# Patient Record
Sex: MALE | Race: WHITE
[De-identification: names, ages, dates, MRNs, and addresses within clinical notes are randomized per-mention and may not be internally consistent; named-entity substitution may affect disease eponyms.]

---

## 2018-11-12 ENCOUNTER — HOSPITAL ENCOUNTER (OUTPATIENT)
Dept: HOSPITAL 94 - VAS | Age: 58
Discharge: HOME | End: 2018-11-12
Attending: FAMILY MEDICINE
Payer: OTHER GOVERNMENT

## 2018-11-12 DIAGNOSIS — I73.89: Primary | ICD-10-CM

## 2018-11-12 DIAGNOSIS — F17.200: ICD-10-CM

## 2018-11-12 PROCEDURE — 93922 UPR/L XTREMITY ART 2 LEVELS: CPT

## 2020-08-14 ENCOUNTER — HOSPITAL ENCOUNTER (OUTPATIENT)
Dept: HOSPITAL 94 - SSTAY O | Age: 60
Discharge: HOME | End: 2020-08-14
Attending: RADIOLOGY
Payer: OTHER GOVERNMENT

## 2020-08-14 VITALS — SYSTOLIC BLOOD PRESSURE: 141 MMHG | DIASTOLIC BLOOD PRESSURE: 96 MMHG

## 2020-08-14 VITALS — DIASTOLIC BLOOD PRESSURE: 80 MMHG | SYSTOLIC BLOOD PRESSURE: 132 MMHG

## 2020-08-14 VITALS — HEIGHT: 68 IN | WEIGHT: 151.02 LBS | BODY MASS INDEX: 22.89 KG/M2

## 2020-08-14 VITALS — DIASTOLIC BLOOD PRESSURE: 80 MMHG | SYSTOLIC BLOOD PRESSURE: 123 MMHG

## 2020-08-14 VITALS — DIASTOLIC BLOOD PRESSURE: 75 MMHG | SYSTOLIC BLOOD PRESSURE: 134 MMHG

## 2020-08-14 DIAGNOSIS — Z85.850: ICD-10-CM

## 2020-08-14 DIAGNOSIS — Z79.899: ICD-10-CM

## 2020-08-14 DIAGNOSIS — J90: Primary | ICD-10-CM

## 2020-08-14 DIAGNOSIS — Z98.890: ICD-10-CM

## 2020-08-14 DIAGNOSIS — E03.9: ICD-10-CM

## 2020-08-14 DIAGNOSIS — Z87.891: ICD-10-CM

## 2020-08-14 DIAGNOSIS — M10.9: ICD-10-CM

## 2020-08-14 DIAGNOSIS — I10: ICD-10-CM

## 2020-08-14 DIAGNOSIS — J44.9: ICD-10-CM

## 2020-08-14 LAB
APPEARANCE SPUN FLD: (no result)
BASOPHILS NFR FLD MANUAL: 1 %
COLOR FLD: YELLOW
GLUCOSE FLD-MCNC: 81 MG/DL
LDH FLD-CCNC: 214 U/L
LYMPHOCYTES NFR FLD MANUAL: 97 %
MONOCYTES NFR FLD MANUAL: 1 %
NEUTROPHILS NFR FLD MANUAL: 1 %
PROT FLD-MCNC: 4 G/DL
RBC # FLD MANUAL: 4600 /CU MM
SPECIMEN VOL FLD: 50 ML
WBC # FLD MANUAL: 5300 /CU MM (ref 0–1000)

## 2020-08-14 PROCEDURE — 87070 CULTURE OTHR SPECIMN AEROBIC: CPT

## 2020-08-14 PROCEDURE — 82945 GLUCOSE OTHER FLUID: CPT

## 2020-08-14 PROCEDURE — 84157 ASSAY OF PROTEIN OTHER: CPT

## 2020-08-14 PROCEDURE — 71045 X-RAY EXAM CHEST 1 VIEW: CPT

## 2020-08-14 PROCEDURE — 83615 LACTATE (LD) (LDH) ENZYME: CPT

## 2020-08-14 PROCEDURE — 32555 ASPIRATE PLEURA W/ IMAGING: CPT

## 2020-08-14 PROCEDURE — 89051 BODY FLUID CELL COUNT: CPT

## 2020-08-24 ENCOUNTER — HOSPITAL ENCOUNTER (OUTPATIENT)
Dept: HOSPITAL 94 - SSTAY O | Age: 60
Discharge: HOME | End: 2020-08-24
Attending: RADIOLOGY
Payer: OTHER GOVERNMENT

## 2020-08-24 VITALS — DIASTOLIC BLOOD PRESSURE: 71 MMHG | SYSTOLIC BLOOD PRESSURE: 103 MMHG

## 2020-08-24 VITALS — SYSTOLIC BLOOD PRESSURE: 97 MMHG | DIASTOLIC BLOOD PRESSURE: 61 MMHG

## 2020-08-24 VITALS — DIASTOLIC BLOOD PRESSURE: 68 MMHG | SYSTOLIC BLOOD PRESSURE: 113 MMHG

## 2020-08-24 VITALS — SYSTOLIC BLOOD PRESSURE: 113 MMHG | DIASTOLIC BLOOD PRESSURE: 68 MMHG

## 2020-08-24 VITALS — SYSTOLIC BLOOD PRESSURE: 112 MMHG | DIASTOLIC BLOOD PRESSURE: 60 MMHG

## 2020-08-24 VITALS — DIASTOLIC BLOOD PRESSURE: 66 MMHG | SYSTOLIC BLOOD PRESSURE: 108 MMHG

## 2020-08-24 VITALS — HEIGHT: 68 IN | BODY MASS INDEX: 22.09 KG/M2 | WEIGHT: 145.73 LBS

## 2020-08-24 VITALS — SYSTOLIC BLOOD PRESSURE: 108 MMHG | DIASTOLIC BLOOD PRESSURE: 70 MMHG

## 2020-08-24 VITALS — SYSTOLIC BLOOD PRESSURE: 101 MMHG | DIASTOLIC BLOOD PRESSURE: 65 MMHG

## 2020-08-24 DIAGNOSIS — M10.9: ICD-10-CM

## 2020-08-24 DIAGNOSIS — Z79.899: ICD-10-CM

## 2020-08-24 DIAGNOSIS — J44.9: ICD-10-CM

## 2020-08-24 DIAGNOSIS — E85.9: ICD-10-CM

## 2020-08-24 DIAGNOSIS — I10: ICD-10-CM

## 2020-08-24 DIAGNOSIS — E03.9: ICD-10-CM

## 2020-08-24 DIAGNOSIS — C91.10: ICD-10-CM

## 2020-08-24 DIAGNOSIS — Z98.890: ICD-10-CM

## 2020-08-24 DIAGNOSIS — J90: Primary | ICD-10-CM

## 2020-08-24 PROCEDURE — 71045 X-RAY EXAM CHEST 1 VIEW: CPT

## 2020-08-24 PROCEDURE — 32555 ASPIRATE PLEURA W/ IMAGING: CPT

## 2020-09-17 ENCOUNTER — HOSPITAL ENCOUNTER (OUTPATIENT)
Dept: HOSPITAL 94 - SSTAY O | Age: 60
Discharge: HOME | End: 2020-09-17
Attending: RADIOLOGY
Payer: OTHER GOVERNMENT

## 2020-09-17 VITALS — SYSTOLIC BLOOD PRESSURE: 106 MMHG | DIASTOLIC BLOOD PRESSURE: 68 MMHG

## 2020-09-17 VITALS — HEIGHT: 68 IN | BODY MASS INDEX: 21.99 KG/M2 | WEIGHT: 145.06 LBS

## 2020-09-17 VITALS — SYSTOLIC BLOOD PRESSURE: 108 MMHG | DIASTOLIC BLOOD PRESSURE: 72 MMHG

## 2020-09-17 VITALS — SYSTOLIC BLOOD PRESSURE: 124 MMHG | DIASTOLIC BLOOD PRESSURE: 77 MMHG

## 2020-09-17 VITALS — DIASTOLIC BLOOD PRESSURE: 73 MMHG | SYSTOLIC BLOOD PRESSURE: 106 MMHG

## 2020-09-17 VITALS — SYSTOLIC BLOOD PRESSURE: 115 MMHG | DIASTOLIC BLOOD PRESSURE: 70 MMHG

## 2020-09-17 DIAGNOSIS — E03.9: ICD-10-CM

## 2020-09-17 DIAGNOSIS — Z98.890: ICD-10-CM

## 2020-09-17 DIAGNOSIS — Z79.899: ICD-10-CM

## 2020-09-17 DIAGNOSIS — I10: ICD-10-CM

## 2020-09-17 DIAGNOSIS — J90: Primary | ICD-10-CM

## 2020-09-17 DIAGNOSIS — J44.9: ICD-10-CM

## 2020-09-17 DIAGNOSIS — Z79.82: ICD-10-CM

## 2020-09-17 DIAGNOSIS — Z87.891: ICD-10-CM

## 2020-09-17 DIAGNOSIS — Z85.6: ICD-10-CM

## 2020-09-17 DIAGNOSIS — M10.9: ICD-10-CM

## 2020-09-17 DIAGNOSIS — Z85.850: ICD-10-CM

## 2020-09-17 PROCEDURE — 71045 X-RAY EXAM CHEST 1 VIEW: CPT

## 2020-09-17 PROCEDURE — 32555 ASPIRATE PLEURA W/ IMAGING: CPT

## 2020-09-17 NOTE — NUR
CXR taken, read by YUMIKO Cody. Provider stated no pneumothorax, pt ok to discharge 
pending stable vital signs.